# Patient Record
Sex: MALE | Race: WHITE | Employment: STUDENT | URBAN - METROPOLITAN AREA
[De-identification: names, ages, dates, MRNs, and addresses within clinical notes are randomized per-mention and may not be internally consistent; named-entity substitution may affect disease eponyms.]

---
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
## 2024-07-08 ENCOUNTER — OFFICE VISIT (OUTPATIENT)
Dept: INTERNAL MEDICINE CLINIC | Facility: CLINIC | Age: 19
End: 2024-07-08
Payer: COMMERCIAL

## 2024-07-08 VITALS
BODY MASS INDEX: 16.93 KG/M2 | OXYGEN SATURATION: 99 % | HEIGHT: 72 IN | HEART RATE: 87 BPM | RESPIRATION RATE: 16 BRPM | DIASTOLIC BLOOD PRESSURE: 52 MMHG | SYSTOLIC BLOOD PRESSURE: 100 MMHG | WEIGHT: 125 LBS

## 2024-07-08 DIAGNOSIS — Z00.00 ANNUAL PHYSICAL EXAM: Primary | ICD-10-CM

## 2024-07-08 DIAGNOSIS — F32.A ANXIETY AND DEPRESSION: ICD-10-CM

## 2024-07-08 DIAGNOSIS — F41.9 ANXIETY AND DEPRESSION: ICD-10-CM

## 2024-07-08 DIAGNOSIS — Z00.00 LABORATORY EXAMINATION ORDERED AS PART OF A ROUTINE GENERAL MEDICAL EXAMINATION: ICD-10-CM

## 2024-07-08 RX ORDER — BUPROPION HYDROCHLORIDE 150 MG/1
150 TABLET ORAL DAILY
Qty: 30 TABLET | Refills: 0 | Status: SHIPPED | OUTPATIENT
Start: 2024-07-08

## 2024-07-08 NOTE — PROGRESS NOTES
Wellness Exam    CC: Patient is presenting for a wellness exam    HPI:   Current Complaints: goes to [facility], he is talking with therapist for depression and anxiety. He has tried exapro and prozac without improvement.     Pertinent Family History: History reviewed. No pertinent family history.     Last Health Maintenance Exam: 2023  Colonoscopy:  No recommendations at this time   PSA:  There are no preventive care reminders to display for this patient.   Reported Health: Good    History reviewed. No pertinent past medical history.  History reviewed. No pertinent surgical history.  Social History     Socioeconomic History    Marital status: Single   Tobacco Use    Smoking status: Never    Smokeless tobacco: Never   Vaping Use    Vaping status: Never Used   Substance and Sexual Activity    Alcohol use: Never    Drug use: Never     Current Outpatient Medications on File Prior to Visit   Medication Sig Dispense Refill    naproxen 500 MG Oral Tab Take 1 tablet (500 mg total) by mouth 2 (two) times daily with meals. 28 tablet 0     No current facility-administered medications on file prior to visit.       Review of Systems   Constitutional: Negative for fever, chills and fatigue.   HENT: Negative for hearing loss, congestion, sore throat and neck pain.    Eyes: Negative for pain and visual disturbance.   Respiratory: Negative for cough and shortness of breath.    Cardiovascular: Negative for chest pain and palpitations.   Gastrointestinal: Negative for nausea, vomiting, abdominal pain and diarrhea.   Genitourinary: Negative for urgency, frequency and difficulty urinating.   Musculoskeletal: Negative for arthralgias and gait problem.   Skin: Negative for color change and rash.   Neurological: Negative for tremors, weakness and numbness.   Hematological: Negative for adenopathy. Does not bruise/bleed easily.   Psychiatric/Behavioral: Negative for confusion and agitation.     /52   Pulse 87   Resp 16   Ht  6' (1.829 m)   Wt 125 lb (56.7 kg)   SpO2 99%   BMI 16.95 kg/m²   Physical Exam   Constitutional: He is oriented to person, place, and time. He appears well-developed. No distress.   HENT: Normocephalic and atraumatic. Nose normal. TMs pearly gray, + light reflex.  Mucous membranes moist, dentition normal.  Oropharynx without erythema, exudate or tonsillar hypertrophy  Eyes: EOM are normal. Pupils are equal, round, and reactive to light. No scleral icterus. Fundoscopic exam: Red reflex b/l. No exudates, hemorrhages, a/v nicking, or papilledema seen b/l.  Neck: Normal range of motion. No thyromegaly present.   Cardiovascular: Normal rate, regular rhythm and normal heart sounds.  Exam reveals no friction rub, no murmur heard.  Pulmonary/Chest: Effort normal and breath sounds normal b/l. He has no wheezes or rales.   Abdominal: Soft. Bowel sounds are normal. There is no tenderness. No HSM.  Abdominal aorta normal in size, no hernia appreciated.  Musculoskeletal: Normal range of motion. He exhibits no edema.   Lymphadenopathy: He has no cervical or supraclavicular adenopathy.   : deferred  Neurological: He is alert and oriented to person, place, and time.  DTRs +2 and symmetric b/l.   Skin: Skin is warm. No rash noted. No erythema, pallor or jaundice.   Psychiatric: He has a normal mood and affect. His behavior is normal.       Assessment and Plan:  Julio Angela III is a 18 year old male here for a wellness exam  Age appropriate cancer screening, labs, safety, immunizations were discussed with the patient and ordered as follows:    Anxiety/depression- trial wellbutrin for 1 month and follow up via video  Orders Placed This Encounter   Procedures    CBC With Differential With Platelet     Standing Status:   Future     Standing Expiration Date:   7/8/2025    Comp Metabolic Panel (14)     Standing Status:   Future     Standing Expiration Date:   7/3/2025    Lipid Panel     Standing Status:   Future     Standing  Expiration Date:   7/3/2025    TSH W Reflex To Free T4     Standing Status:   Future     Standing Expiration Date:   7/3/2025    Urinalysis, Routine     Standing Status:   Future     Standing Expiration Date:   7/8/2025    Vitamin D     Standing Status:   Future     Standing Expiration Date:   7/8/2025     Order Specific Question:   Please pick the scenario that best fits the purpose for ordering this test     Answer:   General Screening/Vit D deficiency (25-Hydroxy)     Order Specific Question:   Release to patient     Answer:   Immediate          His 5 year prevention plan includes: annual physical and labs. 30 mins exercise most days of the week and heart healthy diet   Patient/Caregiver Education:  Patient/Caregiver Education: There are no barriers to learning. Medical education done.  Outcome: Patient verbalizes understanding.      Educated by: RHONA Dolan

## (undated) NOTE — LETTER
Date: 12/2/2022    Patient Name: Pretty Prado          To Whom it may concern: This letter has been written at the patient's request. The above patient was seen at the [facility] for treatment of a medical condition. This patient should be excused from attending work from 12/2/2022 through 12/3/2022. The patient may return to work on 12/4/2022.         Sincerely,          DEVIN Oviedo